# Patient Record
Sex: FEMALE | Race: BLACK OR AFRICAN AMERICAN | URBAN - METROPOLITAN AREA
[De-identification: names, ages, dates, MRNs, and addresses within clinical notes are randomized per-mention and may not be internally consistent; named-entity substitution may affect disease eponyms.]

---

## 2020-08-05 ENCOUNTER — OFFICE (OUTPATIENT)
Dept: URBAN - METROPOLITAN AREA CLINIC 14 | Facility: CLINIC | Age: 55
End: 2020-08-05
Payer: MEDICAID

## 2020-08-05 VITALS
WEIGHT: 270 LBS | RESPIRATION RATE: 18 BRPM | DIASTOLIC BLOOD PRESSURE: 65 MMHG | SYSTOLIC BLOOD PRESSURE: 126 MMHG | HEART RATE: 58 BPM | HEIGHT: 68 IN

## 2020-08-05 DIAGNOSIS — K59.00 CONSTIPATION, UNSPECIFIED: ICD-10-CM

## 2020-08-05 DIAGNOSIS — K21.9 GASTRO-ESOPHAGEAL REFLUX DISEASE WITHOUT ESOPHAGITIS: ICD-10-CM

## 2020-08-05 DIAGNOSIS — Z98.84 BARIATRIC SURGERY STATUS: ICD-10-CM

## 2020-08-05 DIAGNOSIS — R10.13 EPIGASTRIC PAIN: ICD-10-CM

## 2020-08-05 PROCEDURE — 99204 OFFICE O/P NEW MOD 45 MIN: CPT | Performed by: NURSE PRACTITIONER

## 2020-08-05 NOTE — SERVICEHPINOTES
Lynn Shipley   is a   54   year old  female   here today at the request of Dr. Bennett for evaluation of recurrent abdominal pain.  The patient had a gastric bypass 03/2016 and notes that about 4-5 months later she began having upper abdominal pain.  This occurs at varying times and does awaken her from sleep.  She describes this as a throbbing pain with intermittent pressure that does not radiate.  There is no specific relation to eating.  Her chronic reflux is controlled with omeprazole which she has been taking for at least 2 years.  She does note the sensation that food sits at her ribcage but she denies dysphagia.  She denies any NSAIDs but does smoke about half a pack of cigarettes per day.  She suffers from chronic constipation and has a bowel movement about twice a week but notes this is not a change and she does not take any medications for this.  She reportedly had a colonoscopy by Dr. Hopkins in 2017 or 2018 but this is not available for review.  She notes there were no polyps.  She did have significant results with the gastric bypass but has regained about 50 lb in the last couple of years.  She had previous  evaluation by Dr. Joyce a few months after her surgery with EGD and upper GI with small-bowel follow-through that were essentially normal.